# Patient Record
Sex: FEMALE | Race: WHITE | NOT HISPANIC OR LATINO
[De-identification: names, ages, dates, MRNs, and addresses within clinical notes are randomized per-mention and may not be internally consistent; named-entity substitution may affect disease eponyms.]

---
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
## 2020-01-27 ENCOUNTER — APPOINTMENT (OUTPATIENT)
Dept: ORTHOPEDIC SURGERY | Facility: CLINIC | Age: 20
End: 2020-01-27

## 2020-07-29 ENCOUNTER — APPOINTMENT (OUTPATIENT)
Dept: ORTHOPEDIC SURGERY | Facility: CLINIC | Age: 20
End: 2020-07-29
Payer: COMMERCIAL

## 2020-07-29 VITALS — TEMPERATURE: 98.1 F

## 2020-07-29 PROCEDURE — 99213 OFFICE O/P EST LOW 20 MIN: CPT

## 2021-01-04 ENCOUNTER — EMERGENCY (EMERGENCY)
Facility: HOSPITAL | Age: 21
LOS: 0 days | Discharge: HOME | End: 2021-01-04
Attending: FAMILY MEDICINE
Payer: COMMERCIAL

## 2021-01-04 VITALS
OXYGEN SATURATION: 100 % | DIASTOLIC BLOOD PRESSURE: 66 MMHG | HEART RATE: 87 BPM | SYSTOLIC BLOOD PRESSURE: 110 MMHG | TEMPERATURE: 98 F | RESPIRATION RATE: 18 BRPM

## 2021-01-04 VITALS — WEIGHT: 151.9 LBS | HEIGHT: 72 IN

## 2021-01-04 DIAGNOSIS — G43.109 MIGRAINE WITH AURA, NOT INTRACTABLE, WITHOUT STATUS MIGRAINOSUS: ICD-10-CM

## 2021-01-04 DIAGNOSIS — R20.0 ANESTHESIA OF SKIN: ICD-10-CM

## 2021-01-04 DIAGNOSIS — R51.9 HEADACHE, UNSPECIFIED: ICD-10-CM

## 2021-01-04 DIAGNOSIS — Z20.822 CONTACT WITH AND (SUSPECTED) EXPOSURE TO COVID-19: ICD-10-CM

## 2021-01-04 DIAGNOSIS — Z86.69 PERSONAL HISTORY OF OTHER DISEASES OF THE NERVOUS SYSTEM AND SENSE ORGANS: ICD-10-CM

## 2021-01-04 LAB
ALBUMIN SERPL ELPH-MCNC: 3.9 G/DL — SIGNIFICANT CHANGE UP (ref 3.3–5)
ALP SERPL-CCNC: 51 U/L — SIGNIFICANT CHANGE UP (ref 40–120)
ALT FLD-CCNC: 20 U/L — SIGNIFICANT CHANGE UP (ref 12–78)
ANION GAP SERPL CALC-SCNC: 5 MMOL/L — SIGNIFICANT CHANGE UP (ref 5–17)
APPEARANCE UR: CLEAR — SIGNIFICANT CHANGE UP
APTT BLD: 29.4 SEC — SIGNIFICANT CHANGE UP (ref 27.5–35.5)
AST SERPL-CCNC: 14 U/L — LOW (ref 15–37)
BASOPHILS # BLD AUTO: 0.03 K/UL — SIGNIFICANT CHANGE UP (ref 0–0.2)
BASOPHILS NFR BLD AUTO: 0.5 % — SIGNIFICANT CHANGE UP (ref 0–2)
BILIRUB SERPL-MCNC: 0.4 MG/DL — SIGNIFICANT CHANGE UP (ref 0.2–1.2)
BILIRUB UR-MCNC: NEGATIVE — SIGNIFICANT CHANGE UP
BUN SERPL-MCNC: 8 MG/DL — SIGNIFICANT CHANGE UP (ref 7–23)
CALCIUM SERPL-MCNC: 9.4 MG/DL — SIGNIFICANT CHANGE UP (ref 8.5–10.1)
CHLORIDE SERPL-SCNC: 106 MMOL/L — SIGNIFICANT CHANGE UP (ref 96–108)
CO2 SERPL-SCNC: 27 MMOL/L — SIGNIFICANT CHANGE UP (ref 22–31)
COLOR SPEC: YELLOW — SIGNIFICANT CHANGE UP
CREAT SERPL-MCNC: 0.85 MG/DL — SIGNIFICANT CHANGE UP (ref 0.5–1.3)
DIFF PNL FLD: ABNORMAL
EOSINOPHIL # BLD AUTO: 0.04 K/UL — SIGNIFICANT CHANGE UP (ref 0–0.5)
EOSINOPHIL NFR BLD AUTO: 0.6 % — SIGNIFICANT CHANGE UP (ref 0–6)
GLUCOSE SERPL-MCNC: 91 MG/DL — SIGNIFICANT CHANGE UP (ref 70–99)
GLUCOSE UR QL: NEGATIVE MG/DL — SIGNIFICANT CHANGE UP
HCG UR QL: NEGATIVE — SIGNIFICANT CHANGE UP
HCT VFR BLD CALC: 39.3 % — SIGNIFICANT CHANGE UP (ref 34.5–45)
HGB BLD-MCNC: 13 G/DL — SIGNIFICANT CHANGE UP (ref 11.5–15.5)
IMM GRANULOCYTES NFR BLD AUTO: 0.2 % — SIGNIFICANT CHANGE UP (ref 0–1.5)
INR BLD: 1.07 RATIO — SIGNIFICANT CHANGE UP (ref 0.88–1.16)
KETONES UR-MCNC: ABNORMAL
LEUKOCYTE ESTERASE UR-ACNC: ABNORMAL
LYMPHOCYTES # BLD AUTO: 2.98 K/UL — SIGNIFICANT CHANGE UP (ref 1–3.3)
LYMPHOCYTES # BLD AUTO: 46.1 % — HIGH (ref 13–44)
MCHC RBC-ENTMCNC: 30.5 PG — SIGNIFICANT CHANGE UP (ref 27–34)
MCHC RBC-ENTMCNC: 33.1 GM/DL — SIGNIFICANT CHANGE UP (ref 32–36)
MCV RBC AUTO: 92.3 FL — SIGNIFICANT CHANGE UP (ref 80–100)
MONOCYTES # BLD AUTO: 0.63 K/UL — SIGNIFICANT CHANGE UP (ref 0–0.9)
MONOCYTES NFR BLD AUTO: 9.7 % — SIGNIFICANT CHANGE UP (ref 2–14)
NEUTROPHILS # BLD AUTO: 2.78 K/UL — SIGNIFICANT CHANGE UP (ref 1.8–7.4)
NEUTROPHILS NFR BLD AUTO: 42.9 % — LOW (ref 43–77)
NITRITE UR-MCNC: NEGATIVE — SIGNIFICANT CHANGE UP
PH UR: 9 — HIGH (ref 5–8)
PLATELET # BLD AUTO: 369 K/UL — SIGNIFICANT CHANGE UP (ref 150–400)
POTASSIUM SERPL-MCNC: 3.6 MMOL/L — SIGNIFICANT CHANGE UP (ref 3.5–5.3)
POTASSIUM SERPL-SCNC: 3.6 MMOL/L — SIGNIFICANT CHANGE UP (ref 3.5–5.3)
PROT SERPL-MCNC: 8.4 GM/DL — HIGH (ref 6–8.3)
PROT UR-MCNC: 30 MG/DL
PROTHROM AB SERPL-ACNC: 12.5 SEC — SIGNIFICANT CHANGE UP (ref 10.6–13.6)
RBC # BLD: 4.26 M/UL — SIGNIFICANT CHANGE UP (ref 3.8–5.2)
RBC # FLD: 13.6 % — SIGNIFICANT CHANGE UP (ref 10.3–14.5)
SARS-COV-2 RNA SPEC QL NAA+PROBE: SIGNIFICANT CHANGE UP
SODIUM SERPL-SCNC: 138 MMOL/L — SIGNIFICANT CHANGE UP (ref 135–145)
SP GR SPEC: 1.01 — SIGNIFICANT CHANGE UP (ref 1.01–1.02)
TROPONIN I SERPL-MCNC: <0.015 NG/ML — SIGNIFICANT CHANGE UP (ref 0.01–0.04)
UROBILINOGEN FLD QL: NEGATIVE MG/DL — SIGNIFICANT CHANGE UP
WBC # BLD: 6.47 K/UL — SIGNIFICANT CHANGE UP (ref 3.8–10.5)
WBC # FLD AUTO: 6.47 K/UL — SIGNIFICANT CHANGE UP (ref 3.8–10.5)

## 2021-01-04 PROCEDURE — U0003: CPT

## 2021-01-04 PROCEDURE — 81025 URINE PREGNANCY TEST: CPT

## 2021-01-04 PROCEDURE — 70450 CT HEAD/BRAIN W/O DYE: CPT

## 2021-01-04 PROCEDURE — 85730 THROMBOPLASTIN TIME PARTIAL: CPT

## 2021-01-04 PROCEDURE — 70496 CT ANGIOGRAPHY HEAD: CPT

## 2021-01-04 PROCEDURE — U0005: CPT

## 2021-01-04 PROCEDURE — 70498 CT ANGIOGRAPHY NECK: CPT

## 2021-01-04 PROCEDURE — 99284 EMERGENCY DEPT VISIT MOD MDM: CPT | Mod: 25

## 2021-01-04 PROCEDURE — 85610 PROTHROMBIN TIME: CPT

## 2021-01-04 PROCEDURE — 96374 THER/PROPH/DIAG INJ IV PUSH: CPT | Mod: XU

## 2021-01-04 PROCEDURE — 93005 ELECTROCARDIOGRAM TRACING: CPT

## 2021-01-04 PROCEDURE — 36415 COLL VENOUS BLD VENIPUNCTURE: CPT

## 2021-01-04 PROCEDURE — 80053 COMPREHEN METABOLIC PANEL: CPT

## 2021-01-04 PROCEDURE — 84484 ASSAY OF TROPONIN QUANT: CPT

## 2021-01-04 PROCEDURE — 93010 ELECTROCARDIOGRAM REPORT: CPT

## 2021-01-04 PROCEDURE — 82962 GLUCOSE BLOOD TEST: CPT

## 2021-01-04 PROCEDURE — 70498 CT ANGIOGRAPHY NECK: CPT | Mod: 26

## 2021-01-04 PROCEDURE — 99285 EMERGENCY DEPT VISIT HI MDM: CPT

## 2021-01-04 PROCEDURE — 85025 COMPLETE CBC W/AUTO DIFF WBC: CPT

## 2021-01-04 PROCEDURE — 81001 URINALYSIS AUTO W/SCOPE: CPT

## 2021-01-04 PROCEDURE — 70496 CT ANGIOGRAPHY HEAD: CPT | Mod: 26

## 2021-01-04 PROCEDURE — 70450 CT HEAD/BRAIN W/O DYE: CPT | Mod: 26,59

## 2021-01-04 RX ORDER — ASPIRIN/CALCIUM CARB/MAGNESIUM 324 MG
325 TABLET ORAL ONCE
Refills: 0 | Status: COMPLETED | OUTPATIENT
Start: 2021-01-04 | End: 2021-01-04

## 2021-01-04 RX ORDER — SODIUM CHLORIDE 9 MG/ML
1000 INJECTION INTRAMUSCULAR; INTRAVENOUS; SUBCUTANEOUS ONCE
Refills: 0 | Status: COMPLETED | OUTPATIENT
Start: 2021-01-04 | End: 2021-01-04

## 2021-01-04 RX ORDER — METOCLOPRAMIDE HCL 10 MG
5 TABLET ORAL ONCE
Refills: 0 | Status: COMPLETED | OUTPATIENT
Start: 2021-01-04 | End: 2021-01-04

## 2021-01-04 RX ADMIN — Medication 325 MILLIGRAM(S): at 20:15

## 2021-01-04 RX ADMIN — SODIUM CHLORIDE 1000 MILLILITER(S): 9 INJECTION INTRAMUSCULAR; INTRAVENOUS; SUBCUTANEOUS at 21:40

## 2021-01-04 RX ADMIN — Medication 5 MILLIGRAM(S): at 21:40

## 2021-01-04 NOTE — ED PROVIDER NOTE - PATIENT PORTAL LINK FT
You can access the FollowMyHealth Patient Portal offered by [facility] by registering at the following website: [email or web address]. By joining [employer]’s FollowMyHealth portal, you will also be able to view your health information using other applications (apps) compatible with our system.

## 2021-01-04 NOTE — ED PROVIDER NOTE - NSFOLLOWUPINSTRUCTIONS_ED_ALL_ED_FT
MIGRAINE HEADACHE - General Information           Migraine Headache    WHAT YOU NEED TO KNOW:    What is a migraine headache? A migraine is a severe headache. The pain can be so severe that it interferes with your daily activities. A migraine can last a few hours up to several days. The exact cause of migraines is not known.     What can trigger a migraine headache?   •Stress, eye strain, oversleeping, or not getting enough sleep      •Hormone changes in women from birth control pills, pregnancy, menopause, or during a monthly period      •Skipping meals, going too long without eating, or not drinking enough liquids      •Certain foods or drinks such as chocolate, hard cheese, red wine, or drinks that contain caffeine      •Foods that contain gluten, nitrates, MSG, or artificial sweeteners      •Sunlight, bright or flashing lights, loud noises, smoke, or strong smells      •Heat, humidity, or changes in the weather       What are the warning signs that a migraine headache is about to start? Warning signs usually start 15 to 60 minutes before the headache:  •Visual changes (auras), such as blurred vision, temporary blind or bright spots, lines, or hallucinations      •Unusual tiredness or frequent yawning      •Tingling in an arm or leg      What are the signs and symptoms of a migraine headache? A migraine headache usually begins as a dull ache around the eye or temple. The pain may get worse with movement. You may also have the following:  •Pain in your head that may increase to the point that you cannot do everyday activities      •Pain on one or both sides of your head      •Throbbing, pulsing, or pounding pain in your head      •Nausea and vomiting      •Sensitivity to light, noise, or smells      How is a migraine headache diagnosed? Your healthcare provider will ask questions about your headaches. Describe the pain and any other symptoms, such as nausea. Tell the provider if you think anything triggered the pain. The provider will also want to know what you ate and drank before the pain started. Tell the provider about any medical conditions you have or that run in your family. Include any recent stressors you have had. You may also need any of the following:   •A neurologic exam is used to check how your pupils react to light. Your healthcare provider may check your memory, hand grasp, and balance.       •CT or MRI pictures may be taken of your brain. You may be given contrast liquid to help your brain show up better in the pictures. Tell the healthcare provider if you have ever had an allergic reaction to contrast liquid. Do not enter the MRI room with anything metal. Metal can cause serious injury. Tell the healthcare provider if you have any metal in or on your body.       How is a migraine headache treated? Migraines cannot be cured. The goal of treatment is to reduce your symptoms. Take medicine as soon as you feel a migraine begin.   •Prescription pain medicine may be given. Do not wait until the pain is severe before you take your medicine.       •Migraine medicines are used to help prevent a migraine or stop it once it starts.       •Antinausea medicine may be given to calm your stomach and to help prevent vomiting. This medicine can also help relieve pain.      What can I do to manage my symptoms?   •Rest in a dark, quiet room. This will help decrease your pain. Sleep may also help relieve the pain.      •Apply ice to decrease pain. Use an ice pack, or put crushed ice in a plastic bag. Cover the ice pack with a towel and place it on your head. Apply ice for 15 to 20 minutes every hour.      •Apply heat to decrease pain and muscle spasms. Use a small towel dampened with warm water or a heating pad, or sit in a warm bath. Apply heat on the area for 20 to 30 minutes every 2 hours. You may alternate heat and ice.      •Keep a migraine record. Write down when your migraines start and stop. Include your symptoms and what you were doing when a migraine began. Record what you ate or drank for 24 hours before the migraine started. Keep track of what you did to treat your migraine and if it worked. Bring the migraine record with you to visits with your healthcare provider.      What can I do to prevent another migraine headache?   •Do not smoke. Nicotine and other chemicals in cigarettes and cigars can trigger a migraine or make it worse. Ask your healthcare provider for information if you currently smoke and need help to quit. E-cigarettes or smokeless tobacco still contain nicotine. Talk to your healthcare provider before you use these products.       •Do not drink alcohol. Alcohol can trigger a migraine. It can also keep medicines used to treat your migraines from working.      •Get regular exercise. Exercise may help prevent migraines. Talk to your healthcare provider about the best exercise plan for you. Try to get at least 30 minutes of exercise on most days.      •Manage stress. Stress may trigger a migraine. Learn new ways to relax, such as deep breathing.      •Create a sleep schedule. Go to bed and get up at the same times each day. Do not watch television before bed.      •Eat regular meals. Include healthy foods such as include fruit, vegetables, whole-grain breads, low-fat dairy products, beans, lean meat, and fish. Do not have food or drinks that trigger your migraines.      When should I seek immediate care?   •You have a headache that seems different or much worse than your usual migraine headache.      •You have a severe headache with a fever or a stiff neck.       •You have new problems with speech, vision, balance, or movement.      •You feel like you are going to faint, you become confused, or you have a seizure.       When should I contact my healthcare provider?   •Your migraines interfere with your daily activities.       •Your medicines or treatments stop working.      •You have questions or concerns about your condition or care.      CARE AGREEMENT:    You have the right to help plan your care. Learn about your health condition and how it may be treated. Discuss treatment options with your healthcare providers to decide what care you want to receive. You always have the right to refuse treatment.      Can take tylenol or motrin for headache. Follow up this week with neurology. Return to ER if worse.

## 2021-01-04 NOTE — ED PROVIDER NOTE - CARE PROVIDER_API CALL
Yanet Younger  NEUROLOGY - GENERAL  [address] [place] [address], [place] [address]  Phone: [phone]  Fax: [phone]  Follow Up Time:

## 2021-01-04 NOTE — ED PROVIDER NOTE - CROS ED NEURO ALL NEG
Internal Medicine Teaching Service   Progress Note    Patient: Wicho Barajas Date of Service: 12/3/2020   YOB: 1972 Admission Date: 12/2/2020   MRN: [id number] Attending: Damian Birch MD       SUBJECTIVE   SUMMARY:  Wicho Barajas is a 48 year old male who was admitted on 12/2/2020 for new onset atrial fibrillation and COVID infection.     Chief Complaint   Patient presents with   • Suspected Coronavirus (Covid-19)     INTERVAL EVENTS:  No acute events overnight. Patient is feeling better this morning. He did have another episode of vomiting overnight, he states the vomit was clear, non-bloody, non-bilious. He had a normal BM this movement, but noted that his urine looked dark but denies any pain or discomfort with urination. He still has a cough, chills, and generalized body aches and weakness. His appetite has improved somewhat, he denies any CP, SOB, palpitations, lightheadedness or dizziness.     OBJECTIVE     Scheduled Medications rivaroxaban, 20 mg, Daily with dinner  metoPROLOL succinate, 37.5 mg, BID  sodium chloride (PF), 2 mL, 2 times per day  hydrochlorothiazide, 25 mg, Daily  Potassium Standard Replacement Protocol, , See Admin Instructions  Magnesium Standard Replacement Protocol, , See Admin Instructions  guaiFENesin, 1,200 mg, 2 times per day  melatonin, 6 mg, Nightly      Continuous Infusions   • sodium chloride 0.9% infusion 100 mL/hr at 12/03/20 1119       PHYSICAL EXAM   Vital signs:    Visit Vitals  /70 (BP Location: RUE - Right upper extremity, Patient Position: Semi-Spencer's)   Pulse 99   Temp 96.6 °F (35.9 °C) (Oral)   Resp 19   Ht 5' 11\" (1.803 m)   Wt 99.5 kg   SpO2 95%   BMI 30.59 kg/m²       Gen:   NAD, comfortable appearing obese male  HEENT:   PERRL, anicteric, no conjunctival pallor, no nasal discharge  CVS:   RRR, no M/R/G, S1/S2 normal, no B/L LE edema  Pulm:   CTAB, no W/C/R, no retractions or increased work of breathing  GI:    +BS, soft, NT/ND  MSK:     ROM normal throughout, strength 5/5 in all extremities  Skin:   No rashes, lesions, ecchymoses or jaundice on exposed skin  Neuro:  A/Ox3, no gross motor or sensory deficits, no facial droop or dysarthria  Psych:   Appropriate mood/affect    I&O'S / LABS / IMAGING     I/Os:      Intake/Output Summary (Last 24 hours) at 12/3/2020 1447  Last data filed at 12/3/2020 1058  Gross per 24 hour   Intake 939 ml   Output --   Net 939 ml       LAB RESULTS  Recent Labs   Lab 12/03/20  0607 12/02/20  0834   WBC 7.8 8.4   HCT 54.1* 57.9*   HGB 17.2* 19.4*    347     Recent Labs   Lab 12/03/20  0611 12/02/20  0834   SODIUM 133* 136   POTASSIUM 4.1 3.9   CHLORIDE 99 97*   CO2 22 27   GLUCOSE 96 109*   BUN 21* 34*   CREATININE 0.92 1.50*       IMAGING  Us Renal Complete (comp Urinary System)    Result Date: 12/2/2020  Narrative: EXAM: US RENAL COMPLETE (COMPLETE URINARY SYSTEM) HISTORY: MURRAY COMPARISON: CT scan January 24, 2015. TECHNIQUE: Grayscale and color Doppler imaging of the kidneys and urinary bladder. FINDINGS: Sonographic evaluation is limited due to the patient's condition and inability to cooperate with the study. Intervening bowel gas. Kidneys: No hydronephrosis or abnormal perinephric fluid collections. 10 mm anechoic area at the lower pole the left kidney, probable small cyst, difficult to characterize on this study. No renal calculi are visualized. Right Kidney Measurement: 10.3 x 5.8 x 5.8 cm Left Kidney Measurement: 11.5 x 6.4 x 7.4 cm Ureteral Jets: Bilateral ureteral jets are noted. Bladder: No bladder mass or bladder calculi are visualized sonographically.     Impression: IMPRESSION: No hydronephrosis or abnormal perinephric fluid collections. Probable small cyst at the lower pole of the left kidney. Bilateral ureteral jets are visualized.    Xr Chest Ap Or Pa - Portable    Result Date: 12/2/2020  Narrative: EXAM: XR CHEST AP OR PA CLINICAL INDICATION: sob COMPARISON: 4/22/2018 FINDINGS: There are mild  patchy predominately lung base pulmonary opacities which may represent atelectasis and/or viral pneumonia. There is no effusion. Heart and mediastinum are stable.     Impression: IMPRESSION: Mild patchy bilateral pulmonary opacities may represent atelectasis and/or viral pneumonia.     Ct Chest Pe Imaging    Result Date: 12/2/2020  Narrative: EXAM: CT ANGIOGRAM CHEST PE IMAGING- 3D HISTORY: Male 48 years PE suspected, low pretest prob. COMPARISON: AP chest x-ray performed on 12/2/2020 TECHNIQUE:  CT of the chest performed during the administration of 75 mL of intravenous Isovue-370 contrast.  Data acquisition was obtained during the pulmonary arterial phase of enhancement.  3D reconstruction and 2D multiplanar reformations were also sent to PACS with the primary data set. FINDINGS: VASCULAR Quality: Fair opacification of the pulmonary arteries. There is some artifact present. PE Assessment: Artifact present and could not completely exclude the possibly some small subsegmental pulmonary emboli but no evidence of any significant pulmonary embolus. Main PA Diameter: Not significantly dilated. Thoracic aorta: No thoracic aortic aneurysm. Heart: Size within normal limits. No pericardial effusion. LUNGS Pleura: No pleural effusion, thickening, or pneumothorax. Parenchyma: Patchy bilateral peripherally orientated infiltrates are noted consistent with an atypical pneumonia Airways: Central airways are patent. CHEST Neck Base/Thyroid: No mass. Mediastinum/Lymph nodes: No enlarged lymph nodes or masses. Esophagus: No pathologic distension or obvious mass. SUPPORT DEVICES: None. BONES/SOFT TISSUES: No significant lesion. UPPER ABDOMEN: Unremarkable.     Impression: IMPRESSION: Artifact is present but there is no evidence of any significant pulmonary embolus as outlined above 2. Bilateral patchy infiltrates consistent with an atypical pneumonia      ASSESSMENT & PLAN     Wicho Barajas is a 48 year old male who was  admitted on 12/2/2020 for     # A-Fib with RVR, new: no history of Afib found in chart  - Responded well to Diltiazem bolus and drip, returned to NSR   - Metoprolol 37.5 mg BID, titrate up as tolerated  - Monitor telemetry, restart diltiazem if afib returns  - Echocardiogram 12/3/2020   - EF 66%, mild basal septal hypertrophy, no RWMA, Grade I/IV diastolic dysfunction, RVSP could not be calculated.   - Normal LA size/volume, no significant valve abnormalities.   - Cardiology consulted, recommendations appreciated  - CHADSVASC 1-2 (HTN, diastolic HF?)  - Start Xarelto 20 mg daily   - NTproBNP 1492, monitor for signs of HF/volume overload.  - Daily weights, I/Os, cardiac diet  - Monitor and replace electrolytes PRN     # COVID 19 infection: BL patchy infiltrates on imaging, not-hypoxic, not requiring O2  - Trend inflammatory markers  COVID LABS:   Recent Labs   Lab 12/03/20  0611 12/03/20  0607 12/02/20  1626 12/02/20  0953 12/02/20  0834 12/02/20  0831   PCT  --   --   --   --  0.22*  --    DDIMER  --  0.51  --  0.74*  --   --    FERR  --  893*  --   --  1,492*  --    LDH  --  311*  --   --  429*  --    CRP 3.4*  --   --   --  4.9*  --    CPK  --   --   --   --  1,385*  --    QTC  --   --  455  --   --  455   - PRN Tylenol, zofran, compazine  - Mucinex BID, tessalon perles PRN   - Strep and legionella urine antigen negative     # Asymptomatic Bacteriuria: UA positive  - asymptomatic, DC abx  - BCx: NGTD  - WBC 8.4      # MURRAY - resolved  - Cr 1.5 >> 0.92  - Urine sodium, protein/creatinine: wnl  - Renal US: normal  - IVF: NS 100mL/hr  - Monitor and replace electrolytes PRN  - Daily BMP     # HTN  - Continue PTA HCTZ and amlodipine  - Monitor vitals      # Asthma  - Continue PRN albuterol  - Recommend outpatient sleep study     Nutrition: Cardiac Diet              Quality Indicators   Heart failure? No  Stroke measures indicated? no  AMI? NO  Pneumonia? NO  DVT/VTE Prophylaxis:  VTE Pharmacologic Prophylaxis: Yes  VTE  Mechanical Prophylaxis: Yes      This patient was seen, examined and discussed with Damian Birch MD who agrees with the above findings, assessment and plan.    Jeffrey Glisch, DO  PGY-I  IM  12/3/2020 2:47 PM  Pager:  [phone]    Please contact the cross cover pager ([place]: ) or ([facility] ) for questions between 7PM to 7AM Monday through Friday and between 11AM to 7AM on the weekends. Thank you.     - - -

## 2021-01-04 NOTE — ED ADULT NURSE NOTE - OBJECTIVE STATEMENT
Pt presents to ED c/o left finger numbness, headache onset 1700 and visual changes. Pt states she was looking at something on her computer, and noticed her peripheral vision "was dark." Pt went to lay down and got up a couple minutes later to wash the dishes. Pt noticed her left hand, middle and ring finger was numb and left side of her face felt numb. No in ER, numbness and visual changes have resolved, but pt still c/o headache right side. Denies changes in LOC.

## 2021-01-04 NOTE — ED ADULT TRIAGE NOTE - CHIEF COMPLAINT QUOTE
pt states she got up from a laying position and had blurred vision, tp sates she alid back down onto her left arm and when she got up she felt left arm numbness in her fingers and hand. pt states that subsided and a  headache followed. pt states this started 1.5 hours ago. pt states she now feels headache and feels off. pt denies dizziness NV lethargy confusion cognitive or motor imapirment. befast negative. . pt denies fevers fall traum and medical conditions, befast negative.. mother   pt states she got up from a laying position and had blurred vision, tp sates she alid back down onto her left arm and when she got up she felt left arm numbness in her fingers and hand. pt states that subsided and a  headache followed. pt states this started 1.5 hours ago. pt states she now feels headache and feels off. pt denies dizziness NV lethargy confusion cognitive or motor imapirment. befast negative. . pt denies fevers fall traum and medical conditions, befast negative.. mother   code stroke called at 1854 pt states she got up from a laying position and had blurred vision, tp sates she alid back down onto her left arm and when she got up she felt left arm numbness in her fingers and hand. pt states that subsided and a  headache followed. pt states this started 1.5 hours ago. pt states she now feels headache and feels off. pt denies dizziness NV lethargy confusion cognitive or motor imapirment. befast negative. . pt denies fevers fall traum and medical conditions, befast negative.. mother   code stroke called at 1855 by Dr daniel glucose fingerstick performed by HÉCTOR pt states she got up from a laying position and had blurred vision, tp sates she alid back down onto her left arm and when she got up she felt left arm numbness in her fingers and hand. pt states that subsided and a  headache followed. pt states this started 1.5 hours ago. pt states she now feels headache and feels off. pt denies dizziness NV lethargy confusion cognitive or motor imapirment. befast negative. . pt denies fevers fall traum and medical conditions, befast negative.. mother   code stroke called at 1855 by Dr daniel glucose fingerstick performed by HÉCTOR glucose 92

## 2021-01-04 NOTE — ED STATDOCS - DOMESTIC TRAVEL HIGH RISK QUESTION
E-prescribed to pharmacy    
Refill Request:  zolpidem (AMBIEN) 10 MG tablet  Sig: TAKE 1 TABLET BY MOUTH EVERY NIGHT AT BEDTIME FOR SLEEP  Last disp 7/11/19 #30 3 Refills    LORazepam (ATIVAN) 1 MG tablet  Sig - Route: TAKE 1 TABLET BY MOUTH EVERY 8 HOURS AS NEEDED FOR ANXIETY - Oral  Last disp 7/11/19 #90 3 Refills    LOV 4/22/19 Upcoming appt 11/11/19    Routing message to pcp to review  
No

## 2021-01-04 NOTE — ED PROVIDER NOTE - PROGRESS NOTE DETAILS
Scribe PS for ED attending, Dr. Ni: neurologist unable to see pt at present on another call. Ramon MARIA for ED attending, Dr. Ni: Spoke to Dr. Younger who states sounds like migraine headache. Pt can f/u in office. Ramon PS for ED attending, Dr. Ni: spoke to mother, explained result, and advised f/u.

## 2021-01-04 NOTE — ED STATDOCS - NS_ ATTENDINGSCRIBEDETAILS _ED_A_ED_FT
I, Ryan Carcamo MD,  performed the initial face to face bedside interview with this patient regarding history of present illness, review of symptoms and relevant past medical, social and family history.  I completed an independent physical examination.    The history, relevant review of systems, past medical and surgical history, medical decision making, and physical examination was documented by the scribe in my presence and I attest to the accuracy of the documentation.

## 2021-01-04 NOTE — ED ADULT NURSE NOTE - NSIMPLEMENTINTERV_GEN_ALL_ED
Implemented All Universal Safety Interventions:  [place] to call system. Call bell, personal items and telephone within reach. Instruct patient to call for assistance. Room bathroom lighting operational. Non-slip footwear when patient is off stretcher. Physically safe environment: no spills, clutter or unnecessary equipment. Stretcher in lowest position, wheels locked, appropriate side rails in place.

## 2021-01-04 NOTE — ED PROVIDER NOTE - CLINICAL SUMMARY MEDICAL DECISION MAKING FREE TEXT BOX
Pt with numbness of hand and face now resolved. Probable migraine with aura. Stroke code prior to me seeing pt. Plan: labs, CT head, tele stroke consult.

## 2021-01-04 NOTE — ED STATDOCS - PROGRESS NOTE DETAILS
Ramon Walker for attending Dr. Carcamo: 19 y/o female with no significant PMHx presents to the ED c/o visual changes, left arm numbness, and headache x1.5 hours. Pt states she was laying down and got up quickly from a laying position and had blurred vision. States laid back and then went to do the dishes and her left arm and fingers became numb and left-sided face became numb. Pt states numbness and visual changes have subsided. Pt reports headache began afterwards. Pt notes current headache and "feeling off". Denies speech changes. Denies Hx of FHx. Not on blood thinners. Denies Hx of similar symptoms in the past. No recent stimulant or drug use. NIH=0. Will send pt to main ED for further evaluation.

## 2021-01-04 NOTE — ED PROVIDER NOTE - OBJECTIVE STATEMENT
21 y/o female with no significant PMHx presents to the ED c/o visual changes, left arm numbness, and left sided facial numbness. Pt states she didn't get enough sleep last night. Pt went to bed for 4 hours and then got up to check something on the computer. Pt states she had tunnel vision with decreased vision on both sides. Pt laid down and the 1.5 hours later pt went to do the dishes and her 2 middle fingers went numb on left hand. Pt noticed later that whole left hand went numb and also noticed left sided facial numbness. Pt then developed right sided HA which felt like sinusitis. Pt rates severity 6/10. Denies N/V. Pt states sx gone away since then. Pt had migraine last year and developed similar visual sx and then developed HA which was relieved by Advil. Pt was never seen by doctor for this. 21 y/o female with no significant PMHx presents to the ED c/o visual changes, left arm numbness, and left sided facial numbness. Pt states she didn't get enough sleep last night. Pt went to bed for 4 hours and then got up to check something on the computer. Pt states she had tunnel vision with decreased vision on both sides. Pt laid down and the 1.5 hours later pt went to do the dishes and her 2 middle fingers went numb on left hand. Pt noticed later that whole left hand went numb and also noticed left sided facial numbness. Pt then developed right sided HA which felt like sinusitis. Pt rates severity 6/10. Denies N/V. Pt states sx gone away since then. Pt had migraine last year and developed similar visual sx and then developed HA which was relieved by Advil. Pt was never seen by doctor for this. Code stroke called on arrival.

## 2021-01-04 NOTE — ED ADULT NURSE NOTE - CHIEF COMPLAINT QUOTE
pt states she got up from a laying position and had blurred vision, tp sates she alid back down onto her left arm and when she got up she felt left arm numbness in her fingers and hand. pt states that subsided and a  headache followed. pt states this started 1.5 hours ago. pt states she now feels headache and feels off. pt denies dizziness NV lethargy confusion cognitive or motor imapirment. befast negative. . pt denies fevers fall traum and medical conditions, befast negative.. mother   code stroke called at 1855 by Dr daniel glucose fingerstick performed by HÉCTOR glucose 92

## 2021-01-11 ENCOUNTER — OUTPATIENT (OUTPATIENT)
Dept: OUTPATIENT SERVICES | Facility: HOSPITAL | Age: 21
LOS: 1 days | End: 2021-01-11
Payer: COMMERCIAL

## 2021-01-11 DIAGNOSIS — Z20.828 CONTACT WITH AND (SUSPECTED) EXPOSURE TO OTHER VIRAL COMMUNICABLE DISEASES: ICD-10-CM

## 2021-01-11 PROCEDURE — U0005: CPT

## 2021-01-11 PROCEDURE — U0003: CPT

## 2021-01-11 PROCEDURE — C9803: CPT

## 2021-01-12 DIAGNOSIS — Z20.828 CONTACT WITH AND (SUSPECTED) EXPOSURE TO OTHER VIRAL COMMUNICABLE DISEASES: ICD-10-CM

## 2021-01-12 LAB — SARS-COV-2 RNA SPEC QL NAA+PROBE: SIGNIFICANT CHANGE UP

## 2021-01-13 ENCOUNTER — APPOINTMENT (OUTPATIENT)
Dept: NEUROLOGY | Facility: CLINIC | Age: 21
End: 2021-01-13
Payer: COMMERCIAL

## 2021-01-13 VITALS
WEIGHT: 150 LBS | HEART RATE: 76 BPM | HEIGHT: 68 IN | DIASTOLIC BLOOD PRESSURE: 67 MMHG | SYSTOLIC BLOOD PRESSURE: 102 MMHG | TEMPERATURE: 97.5 F | BODY MASS INDEX: 22.73 KG/M2

## 2021-01-13 PROCEDURE — 99204 OFFICE O/P NEW MOD 45 MIN: CPT

## 2021-01-13 PROCEDURE — 99072 ADDL SUPL MATRL&STAF TM PHE: CPT

## 2021-01-13 RX ORDER — NORGESTIMATE AND ETHINYL ESTRADIOL 0.25-0.035
0.25-35 KIT ORAL
Refills: 0 | Status: ACTIVE | COMMUNITY

## 2021-01-13 NOTE — HISTORY OF PRESENT ILLNESS
[FreeTextEntry1] : Is 20-year-old patient without any major medical problems seen in [facility] emergency room on 1/4/21 with sudden onset of left arm numbness with left-sided facial numbness with headache without any other focal symptoms. Patient appears to have a migraine march with numbness started in the hand and face resolved within a few seconds started with a severe throbbing pulsatile headache woke up from sleep with the symptoms and visual disturbance. Patient was seen in [facility] emergency room had CT scan and CT angiogram reported negative symptoms resolved after given headache medication now that her current symptoms. Patient lab work and workup was negative no other focal symptoms no recurrent symptoms since that time. No prior headaches. No focal symptoms significantly.\par Patient takes birth control pills for the last one year.\par History of ACL repair. No other major medical problems.\par No prior history of migraines.\par No recurrent symptoms since the discharge from the hospital.\par

## 2021-01-13 NOTE — DISCUSSION/SUMMARY
[FreeTextEntry1] : Patient with history of recent one single episode of migraine headache with visual aura and numbness preceding the event with a nonfocal examination with CT and CT angiogram reported negative in [facility].\par New onset migraine headaches with a nonfocal examination.\par Recommend patient to keep a headache log.\par Recommend use of over-the-counter medications as patient has no recurrent headaches.\par Patient is going back to college would not recommend any medications at this time.\par Consider MRI scan of headaches recurs or increases in frequency.\par Patient education provided regarding migraine and triggers.\par Follow up evaluation when she comes back for spring break.\par Follow up with you for other medical problems.\par

## 2021-01-13 NOTE — REASON FOR VISIT
[Consultation] : a consultation visit [FreeTextEntry1] : Evaluation for New onset migraine with left facial and hand numbness on 1/4/21

## 2021-01-13 NOTE — PHYSICAL EXAM
[General Appearance - Alert] : alert [General Appearance - In No Acute Distress] : in no acute distress [Oriented To Time, Place, And Person] : oriented to person, place, and time [Impaired Insight] : insight and judgment were intact [Affect] : the affect was normal [Person] : oriented to person [Place] : oriented to place [Time] : oriented to time [Concentration Intact] : normal concentrating ability [Visual Intact] : visual attention was ~T not ~L decreased [Naming Objects] : no difficulty naming common objects [Repeating Phrases] : no difficulty repeating a phrase [Writing A Sentence] : no difficulty writing a sentence [Fluency] : fluency intact [Comprehension] : comprehension intact [Reading] : reading intact [Past History] : adequate knowledge of personal past history [Cranial Nerves Optic (II)] : visual acuity intact bilaterally,  visual fields full to confrontation, pupils equal round and reactive to light [Cranial Nerves Oculomotor (III)] : extraocular motion intact [Cranial Nerves Trigeminal (V)] : facial sensation intact symmetrically [Cranial Nerves Facial (VII)] : face symmetrical [Cranial Nerves Vestibulocochlear (VIII)] : hearing was intact bilaterally [Cranial Nerves Glossopharyngeal (IX)] : tongue and palate midline [Cranial Nerves Accessory (XI - Cranial And Spinal)] : head turning and shoulder shrug symmetric [Cranial Nerves Hypoglossal (XII)] : there was no tongue deviation with protrusion [Motor Strength] : muscle strength was normal in all four extremities [No Muscle Atrophy] : normal bulk in all four extremities [Sensation Tactile Decrease] : light touch was intact [Past-pointing] : there was no past-pointing [Balance] : balance was intact [Tremor] : no tremor present [2+] : Ankle jerk left 2+ [Plantar Reflex Right Only] : normal on the right [Plantar Reflex Left Only] : normal on the left [Sclera] : the sclera and conjunctiva were normal [PERRL With Normal Accommodation] : pupils were equal in size, round, reactive to light, with normal accommodation [Extraocular Movements] : extraocular movements were intact [Outer Ear] : the ears and nose were normal in appearance [Oropharynx] : the oropharynx was normal [Neck Appearance] : the appearance of the neck was normal [Neck Cervical Mass (___cm)] : no neck mass was observed [Jugular Venous Distention Increased] : there was no jugular-venous distention [Thyroid Diffuse Enlargement] : the thyroid was not enlarged [Thyroid Nodule] : there were no palpable thyroid nodules [Auscultation Breath Sounds / Voice Sounds] : lungs were clear to auscultation bilaterally [Heart Rate And Rhythm] : heart rate was normal and rhythm regular [Heart Sounds] : normal S1 and S2 [Heart Sounds Gallop] : no gallops [Murmurs] : no murmurs [Heart Sounds Pericardial Friction Rub] : no pericardial rub [Full Pulse] : the pedal pulses are present [Edema] : there was no peripheral edema [Bowel Sounds] : normal bowel sounds [Abdomen Soft] : soft [Abdomen Tenderness] : non-tender [Abdomen Mass (___ Cm)] : no abdominal mass palpated [No CVA Tenderness] : no ~M costovertebral angle tenderness [No Spinal Tenderness] : no spinal tenderness [Abnormal Walk] : normal gait [Nail Clubbing] : no clubbing  or cyanosis of the fingernails [Musculoskeletal - Swelling] : no joint swelling seen [Motor Tone] : muscle strength and tone were normal [Skin Color & Pigmentation] : normal skin color and pigmentation [Skin Turgor] : normal skin turgor [] : no rash

## 2021-12-20 ENCOUNTER — APPOINTMENT (OUTPATIENT)
Dept: ORTHOPEDIC SURGERY | Facility: CLINIC | Age: 21
End: 2021-12-20
Payer: COMMERCIAL

## 2021-12-20 VITALS — HEIGHT: 68 IN | WEIGHT: 150 LBS | BODY MASS INDEX: 22.73 KG/M2

## 2021-12-20 DIAGNOSIS — Z98.890 OTHER SPECIFIED POSTPROCEDURAL STATES: ICD-10-CM

## 2021-12-20 PROCEDURE — 99212 OFFICE O/P EST SF 10 MIN: CPT

## 2022-06-14 NOTE — H&P PST ADULT - VISION (WITH CORRECTIVE LENSES IF THE PATIENT USUALLY WEARS THEM):
Please Advise   
Partially impaired: cannot see medication labels or newsprint, but can see obstacles in path, and the surrounding layout; can count fingers at arm's length

## 2022-11-08 ENCOUNTER — LABORATORY RESULT (OUTPATIENT)
Age: 22
End: 2022-11-08

## 2022-11-08 ENCOUNTER — NON-APPOINTMENT (OUTPATIENT)
Age: 22
End: 2022-11-08

## 2022-11-08 ENCOUNTER — APPOINTMENT (OUTPATIENT)
Dept: RHEUMATOLOGY | Facility: CLINIC | Age: 22
End: 2022-11-08

## 2022-11-08 VITALS
HEART RATE: 77 BPM | BODY MASS INDEX: 23.04 KG/M2 | RESPIRATION RATE: 18 BRPM | SYSTOLIC BLOOD PRESSURE: 116 MMHG | HEIGHT: 68 IN | WEIGHT: 152 LBS | DIASTOLIC BLOOD PRESSURE: 75 MMHG | OXYGEN SATURATION: 100 %

## 2022-11-08 DIAGNOSIS — R20.2 ANESTHESIA OF SKIN: ICD-10-CM

## 2022-11-08 DIAGNOSIS — S83.412A SPRAIN OF MEDIAL COLLATERAL LIGAMENT OF LEFT KNEE, INITIAL ENCOUNTER: ICD-10-CM

## 2022-11-08 DIAGNOSIS — R51.9 HEADACHE, UNSPECIFIED: ICD-10-CM

## 2022-11-08 DIAGNOSIS — R14.0 ABDOMINAL DISTENSION (GASEOUS): ICD-10-CM

## 2022-11-08 DIAGNOSIS — S83.282A OTHER TEAR OF LATERAL MENISCUS, CURRENT INJURY, LEFT KNEE, INITIAL ENCOUNTER: ICD-10-CM

## 2022-11-08 DIAGNOSIS — L65.9 NONSCARRING HAIR LOSS, UNSPECIFIED: ICD-10-CM

## 2022-11-08 DIAGNOSIS — S83.512A SPRAIN OF ANTERIOR CRUCIATE LIGAMENT OF LEFT KNEE, INITIAL ENCOUNTER: ICD-10-CM

## 2022-11-08 DIAGNOSIS — R76.8 OTHER SPECIFIED ABNORMAL IMMUNOLOGICAL FINDINGS IN SERUM: ICD-10-CM

## 2022-11-08 DIAGNOSIS — S83.282D OTHER TEAR OF LATERAL MENISCUS, CURRENT INJURY, LEFT KNEE, SUBSEQUENT ENCOUNTER: ICD-10-CM

## 2022-11-08 DIAGNOSIS — R20.0 ANESTHESIA OF SKIN: ICD-10-CM

## 2022-11-08 DIAGNOSIS — U07.1 COVID-19: ICD-10-CM

## 2022-11-08 DIAGNOSIS — S83.519D SPRAIN OF ANTERIOR CRUCIATE LIGAMENT OF UNSPECIFIED KNEE, SUBSEQUENT ENCOUNTER: ICD-10-CM

## 2022-11-08 DIAGNOSIS — G43.109 MIGRAINE WITH AURA, NOT INTRACTABLE, W/OUT STATUS MIGRAINOSUS: ICD-10-CM

## 2022-11-08 PROCEDURE — 36415 COLL VENOUS BLD VENIPUNCTURE: CPT

## 2022-11-08 PROCEDURE — 99204 OFFICE O/P NEW MOD 45 MIN: CPT | Mod: 25

## 2022-11-09 LAB — CELIACPAN: NORMAL

## 2022-11-16 LAB
AVISE CTD: NORMAL
AVISE SLE: NORMAL

## 2022-11-17 ENCOUNTER — APPOINTMENT (OUTPATIENT)
Dept: RHEUMATOLOGY | Facility: CLINIC | Age: 22
End: 2022-11-17

## 2022-11-22 ENCOUNTER — TRANSCRIPTION ENCOUNTER (OUTPATIENT)
Age: 22
End: 2022-11-22

## 2023-03-07 NOTE — ASU PREOP CHECKLIST - NEEDLE GAUGE
Pt s/p LRHC via R fem artery (angioseal closure) and R fem vein (manual hold). R radial site attempted but unsuccessful. All sites remained CDI with no bleeding or hematoma noted throughout recovery period including after voiding and ambulating in hallway. VSS. Pt denied pain or numbness/tingling to BLE and RUE. Pt tolerated PO intake. IV d/c'd. Discharge instructions given-pt verbalized understanding. Pt to lobby via [facility] and friend to drive home. 20

## 2024-02-27 ENCOUNTER — APPOINTMENT (OUTPATIENT)
Dept: GASTROENTEROLOGY | Facility: CLINIC | Age: 24
End: 2024-02-27
Payer: COMMERCIAL

## 2024-02-27 VITALS
DIASTOLIC BLOOD PRESSURE: 70 MMHG | WEIGHT: 160 LBS | HEIGHT: 68 IN | SYSTOLIC BLOOD PRESSURE: 118 MMHG | BODY MASS INDEX: 24.25 KG/M2

## 2024-02-27 DIAGNOSIS — R10.9 UNSPECIFIED ABDOMINAL PAIN: ICD-10-CM

## 2024-02-27 DIAGNOSIS — R14.0 ABDOMINAL DISTENSION (GASEOUS): ICD-10-CM

## 2024-02-27 PROCEDURE — 99204 OFFICE O/P NEW MOD 45 MIN: CPT

## 2024-02-29 NOTE — ASSESSMENT
[FreeTextEntry1] : 24yo female with bloating and abdominal pain  will check labs - ?IBS vs IBD will start hyoscyamine prn as needed we discussed egd/colonoscopy to r/o IBD pending results of above

## 2024-02-29 NOTE — HISTORY OF PRESENT ILLNESS
[FreeTextEntry1] : 22yo female for evaluation of bloating and lower abdominal pain  She notes some bloating and lower abdominal pain Also with some trouble losing weight which she has never had before She is working with herbal alternative med and maybe some improvement

## 2024-02-29 NOTE — PHYSICAL EXAM
[Alert] : alert [Normal Voice/Communication] : normal voice/communication [Healthy Appearing] : healthy appearing [Hearing Threshold Finger Rub Not [place]] : hearing was normal [No Acute Distress] : no acute distress [Sclera] : the sclera and conjunctiva were normal [Normal Lips/Gums] : the lips and gums were normal [Oropharynx] : the oropharynx was normal [No Neck Mass] : no neck mass was observed [No Respiratory Distress] : no respiratory distress [Normal Appearance] : the appearance of the neck was normal [No Acc Muscle Use] : no accessory muscle use [Respiration, Rhythm And Depth] : normal respiratory rhythm and effort [Heart Rate And Rhythm] : heart rate was normal and rhythm regular [Auscultation Breath Sounds / Voice Sounds] : lungs were clear to auscultation bilaterally [Normal S1, S2] : normal S1 and S2 [Bowel Sounds] : normal bowel sounds [Murmurs] : no murmurs [Abdomen Tenderness] : non-tender [No Masses] : no abdominal mass palpated [Abdomen Soft] : soft [] : no hepatosplenomegaly [Oriented To Time, Place, And Person] : oriented to person, place, and time

## 2024-03-14 ENCOUNTER — RX CHANGE (OUTPATIENT)
Age: 24
End: 2024-03-14

## 2024-03-14 RX ORDER — HYOSCYAMINE SULFATE 0.12 MG/1
0.12 TABLET ORAL 4 TIMES DAILY
Qty: 360 | Refills: 1 | Status: ACTIVE | COMMUNITY
Start: 2024-02-27 | End: 1900-01-01

## 2024-03-29 ENCOUNTER — APPOINTMENT (OUTPATIENT)
Dept: ORTHOPEDIC SURGERY | Facility: CLINIC | Age: 24
End: 2024-03-29
Payer: COMMERCIAL

## 2024-03-29 VITALS — BODY MASS INDEX: 24.25 KG/M2 | HEIGHT: 68 IN | WEIGHT: 160 LBS

## 2024-03-29 DIAGNOSIS — M25.561 PAIN IN RIGHT KNEE: ICD-10-CM

## 2024-03-29 DIAGNOSIS — S82.131A DISPLACED FRACTURE OF MEDIAL CONDYLE OF RIGHT TIBIA, INITIAL ENCOUNTER FOR CLOSED FRACTURE: ICD-10-CM

## 2024-03-29 PROCEDURE — 99213 OFFICE O/P EST LOW 20 MIN: CPT

## 2024-03-29 PROCEDURE — 73562 X-RAY EXAM OF KNEE 3: CPT | Mod: RT

## 2024-04-09 ENCOUNTER — APPOINTMENT (OUTPATIENT)
Dept: GASTROENTEROLOGY | Facility: CLINIC | Age: 24
End: 2024-04-09

## 2024-05-16 ENCOUNTER — APPOINTMENT (OUTPATIENT)
Dept: ORTHOPEDIC SURGERY | Facility: CLINIC | Age: 24
End: 2024-05-16
Payer: COMMERCIAL

## 2024-05-16 VITALS — BODY MASS INDEX: 24.25 KG/M2 | WEIGHT: 160 LBS | HEIGHT: 68 IN

## 2024-05-16 DIAGNOSIS — S82.131D DISPLACED FRACTURE OF MEDIAL CONDYLE OF RIGHT TIBIA, SUBSEQUENT ENCOUNTER FOR CLOSED FRACTURE WITH ROUTINE HEALING: ICD-10-CM

## 2024-05-16 PROCEDURE — 99213 OFFICE O/P EST LOW 20 MIN: CPT

## 2024-05-16 PROCEDURE — 73562 X-RAY EXAM OF KNEE 3: CPT | Mod: RT
